# Patient Record
Sex: MALE | Race: ASIAN | URBAN - METROPOLITAN AREA
[De-identification: names, ages, dates, MRNs, and addresses within clinical notes are randomized per-mention and may not be internally consistent; named-entity substitution may affect disease eponyms.]

---

## 2022-03-02 ENCOUNTER — TELEPHONE (OUTPATIENT)
Dept: FAMILY MEDICINE CLINIC | Age: 57
End: 2022-03-02

## 2022-03-02 NOTE — TELEPHONE ENCOUNTER
----- Message from [employer] sent at 3/2/2022  9:32 AM EST -----  Subject: Appointment Request    Reason for Call: New Patient Request Appointment    QUESTIONS  Type of Appointment? New Patient/New to Provider  Reason for appointment request? No appointments available during search  Additional Information for Provider? Pt's daughter called on his behalf to   schedule an appt to establish care with Flores Walker. [facility]   [facility] as accepting new pts, no appointments found during search. Call pt's   daughter to schedule a new pt appt.  ---------------------------------------------------------------------------  --------------  CALL BACK INFO  What is the best way for the office to contact you? OK to leave message on   voicemail  Preferred Call Back Phone Number? [phone]  ---------------------------------------------------------------------------  --------------  SCRIPT ANSWERS  Relationship to Patient? Other  Representative Name? [employer]  Additional information verified (besides Name and Date of Birth)? Phone   Number  Specialty Confirmation? Primary Care  Is this the first appointment to establish care for a ? No  Have you been diagnosed with, awaiting test results for, or told that you   are suspected of having COVID-19 (Coronavirus)? (If patient has tested   negative or was tested as a requirement for work, school, or travel and   not based on symptoms, answer no)? No  Within the past 10 days have you developed any of the following symptoms   (answer no if symptoms have been present longer than 10 days or began   more than 10 days ago)? Fever or Chills, Cough, Shortness of breath or   difficulty breathing, Loss of taste or smell, Sore throat, Nasal   congestion, Sneezing or runny nose, Fatigue or generalized body aches   (answer no if pain is specific to a body part e.g. back pain), Diarrhea,   Headache? No  Have you had close contact with someone with COVID-19 in the last 7 days? No  (Service Expert  click yes below to proceed with [place] As Usual   Scheduling)?  Yes

## 2022-03-02 NOTE — TELEPHONE ENCOUNTER
Pt was dismissed from [facility] back in December due to no show. Spoke with pt's daughter. She stated that that appointment should have been canceled and not marked as a no show. Pt's wife comes here and their daughter stated that she spoke with Ceasar Weiss about this and that it should have been taken care of. Please advise if ok to schedule or not?

## 2022-03-06 ASSESSMENT — ENCOUNTER SYMPTOMS
DIARRHEA: 0
CHEST TIGHTNESS: 0
WHEEZING: 0
COUGH: 0
CONSTIPATION: 0
SHORTNESS OF BREATH: 0
VOMITING: 0
NAUSEA: 0
ABDOMINAL PAIN: 0

## 2022-03-07 ENCOUNTER — OFFICE VISIT (OUTPATIENT)
Dept: FAMILY MEDICINE CLINIC | Age: 57
End: 2022-03-07

## 2022-03-07 VITALS
SYSTOLIC BLOOD PRESSURE: 112 MMHG | WEIGHT: 141 LBS | HEART RATE: 60 BPM | DIASTOLIC BLOOD PRESSURE: 80 MMHG | OXYGEN SATURATION: 97 % | TEMPERATURE: 98 F

## 2022-03-07 DIAGNOSIS — Z78.9 NON-ENGLISH SPEAKING PATIENT: ICD-10-CM

## 2022-03-07 DIAGNOSIS — M62.838 MUSCLE SPASM: ICD-10-CM

## 2022-03-07 DIAGNOSIS — M25.511 ACUTE PAIN OF RIGHT SHOULDER: Primary | ICD-10-CM

## 2022-03-07 DIAGNOSIS — Z76.89 ENCOUNTER TO ESTABLISH CARE: ICD-10-CM

## 2022-03-07 PROCEDURE — 99203 OFFICE O/P NEW LOW 30 MIN: CPT | Performed by: CLINICAL NURSE SPECIALIST

## 2022-03-07 RX ORDER — TIZANIDINE 2 MG/1
2 TABLET ORAL 2 TIMES DAILY PRN
Qty: 20 TABLET | Refills: 0 | Status: SHIPPED | OUTPATIENT
Start: 2022-03-07

## 2022-03-07 RX ORDER — NAPROXEN 375 MG/1
375 TABLET ORAL 2 TIMES DAILY WITH MEALS
Qty: 60 TABLET | Refills: 0 | Status: SHIPPED | OUTPATIENT
Start: 2022-03-07

## 2022-03-07 ASSESSMENT — PATIENT HEALTH QUESTIONNAIRE - PHQ9
1. LITTLE INTEREST OR PLEASURE IN DOING THINGS: 0
SUM OF ALL RESPONSES TO PHQ QUESTIONS 1-9: 0

## 2022-03-07 NOTE — PATIENT INSTRUCTIONS
both arms. 2. Hold for 15 to 30 seconds. 3. Repeat 2 to 4 times. Follow-up care is a key part of your treatment and safety. Be sure to make and go to all appointments, and call your doctor if you are having problems. It's also a good idea to know your test results and keep a list of the medicines you take. Where can you learn more? Go to https://Clark Enterprises 2000peWhatsNew Asia.Good Deal. org and sign in to your [employer] account. Enter S254 in the Motopia box to learn more about \"Shoulder Stretches: Exercises. \"     If you do not have an account, please click on the \"Sign Up Now\" link. Current as of: July 1, 2021               Content Version: 13.1  © 2006-2021 Healthwise, Integrien. Care instructions adapted under license by [facility] ([facility]). If you have questions about a medical condition or this instruction, always ask your healthcare professional. [place] [address] any warranty or liability for your use of this information. Patient Education        Neck Spasm: Exercises  Introduction  Here are some examples of exercises for you to try. The exercises may be suggested for a condition or for rehabilitation. Start each exercise slowly. Ease off the exercises if you start to have pain. You will be told when to start these exercises and which ones will work best for you. How to do the exercises  Levator scapula stretch    1. Sit in a firm chair, or stand up straight. 2. Gently tilt your head toward your left shoulder. 3. Turn your head to look down into your armpit, bending your head slightly forward. Let the weight of your head stretch your neck muscles. 4. Hold for 15 to 30 seconds. 5. Return to your starting position. 6. Follow the same instructions above, but tilt your head toward your right shoulder. 7. Repeat 2 to 4 times toward each shoulder. Upper trapezius stretch    1. Sit in a firm chair, or stand up straight.   2. This stretch works best if you keep your shoulder down as you lean away from it. To help you remember to do this, start by relaxing your shoulders and lightly holding on to your thighs or your chair. 3. Tilt your head toward your shoulder and hold for 15 to 30 seconds. Let the weight of your head stretch your muscles. 4. If you would like a little added stretch, place your arm behind your back. Use the arm opposite of the direction you are tilting your head. For example, if you are tilting your head to the left, place your right arm behind your back. 5. Repeat 2 to 4 times toward each shoulder. Neck rotation    1. Sit in a firm chair, or stand up straight. 2. Keeping your chin level, turn your head to the right, and hold for 15 to 30 seconds. 3. Turn your head to the left, and hold for 15 to 30 seconds. 4. Repeat 2 to 4 times to each side. Chin tuck    1. Lie on the floor with a rolled-up towel under your neck. Your head should be touching the floor. 2. Slowly bring your chin toward the front of your neck. 3. Hold for a count of 6, and then relax for up to 10 seconds. 4. Repeat 8 to 12 times. Forward neck flexion    1. Sit in a firm chair, or stand up straight. 2. Bend your head forward. 3. Hold for 15 to 30 seconds, then return to your starting position. 4. Repeat 2 to 4 times. Follow-up care is a key part of your treatment and safety. Be sure to make and go to all appointments, and call your doctor if you are having problems. It's also a good idea to know your test results and keep a list of the medicines you take. Where can you learn more? Go to https://Synforapepiceweb.Magneceutical Health. org and sign in to your [employer] account. Enter P962 in the Anergis box to learn more about \"Neck Spasm: Exercises. \"     If you do not have an account, please click on the \"Sign Up Now\" link. Current as of: July 1, 2021               Content Version: 13.1  © 6704-3900 Healthwise, Incorporated.    Care instructions adapted under license by [id number] VenueSpot and shoulder stretch    1. Sit or stand tall and glide your head backward as in the dorsal glide stretch. 2. Raise both arms so that your hands are next to your ears. 3. Take a deep breath, and as you breathe out, lower your elbows down and behind your back. You will feel your shoulder blades slide down and together, and at the same time you will feel a stretch across your chest and the front of your shoulders. 4. Hold for about 6 seconds, and then relax for up to 10 seconds. 5. Repeat 8 to 12 times. Strengthening: Hands on head    1. Move your head backward, forward, and side to side against gentle pressure from your hands, holding each position for about 6 seconds. 2. Repeat 8 to 12 times. Follow-up care is a key part of your treatment and safety. Be sure to make and go to all appointments, and call your doctor if you are having problems. It's also a good idea to know your test results and keep a list of the medicines you take. Where can you learn more? Go to https://Executive Intermediary.Eventbrite. org and sign in to your Bent Pixels account. Enter P975 in the KylesToushay - It's what's in store box to learn more about \"Neck: Exercises. \"     If you do not have an account, please click on the \"Sign Up Now\" link. Current as of: July 1, 2021               Content Version: 13.1  © 7676-7393 Healthwise, Incorporated. Care instructions adapted under license by [facility] ([facility] If you have questions about a medical condition or this instruction, always ask your healthcare professional. [place] [address] any warranty or liability for your use of this information. Patient Education        Learning About How to Have a Healthy Back  What causes back pain? Back pain is often caused by overuse, strain, or injury. For example, people often hurt their backs playing sports or working in the yard, being jolted in a car accident, or lifting something too heavy. Aging plays a part too.  Your bones and muscles tend to lose strength as you age, which makes injury more likely. The spongy discs between the bones of the spine (vertebrae) may suffer from wear and tear and no longer provide enough cushion between the bones. A disc that bulges or breaks open (herniated disc) can press on nerves, causing back pain. In some people, back pain is the result of arthritis, broken vertebrae caused by bone loss (osteoporosis), illness, or a spine problem. Although most people have back pain at one time or another, there are steps you can take to make it less likely. How can you have a healthy back? Reduce stress on your back through good posture   Slumping or slouching alone may not cause low back pain. But after the back has been strained or injured, bad posture can make pain worse. · Sleep in a position that maintains your back's normal curves and on a mattress that feels comfortable. Sleep on your side with a pillow between your knees, or sleep on your back with a pillow under your knees. These positions can reduce strain on your back. · Stand and sit up straight. \"Good posture\" generally means your ears, shoulders, and hips are in a straight line. · If you must stand for a long time, put one foot on a stool, ledge, or box. Switch feet every now and then. · Sit in a chair that is low enough to let you place both feet flat on the floor with both knees nearly level with your hips. If your chair or desk is too high, use a footrest to raise your knees. Place a small pillow, a rolled-up towel, or a lumbar roll in the curve of your back if you need extra support. · Try a kneeling chair, which helps tilt your hips forward. This takes pressure off your lower back. · Try sitting on an exercise ball. It can rock from side to side, which helps keep your back loose. · When driving, keep your knees nearly level with your hips. Sit straight, and drive with both hands on the steering wheel.  Your arms should be in a slightly bent position. Reduce stress on your back through careful lifting   · Squat down, bending at the hips and knees only. If you need to, put one knee to the floor and extend your other knee in front of you, bent at a right angle (half kneeling). · Press your chest straight forward. This helps keep your upper back straight while keeping a slight arch in your low back. · Hold the load as close to your body as possible, at the level of your belly button (navel). · Use your feet to change direction, taking small steps. · Lead with your hips as you change direction. Keep your shoulders in line with your hips as you move. · Set down your load carefully, squatting with your knees and hips only. Exercise and stretch your back   · Do some exercise on most days of the week, if your doctor says it is okay. You can walk, run, swim, or cycle. · Stretch your back muscles. Here are a few exercises to try:  ? Lie on your back, and gently pull one bent knee to your chest. Put that foot back on the floor, and then pull the other knee to your chest.  ? Do pelvic tilts. Lie on your back with your knees bent. Tighten your stomach muscles. Pull your belly button (navel) in and up toward your ribs. You should feel like your back is pressing to the floor and your hips and pelvis are slightly lifting off the floor. Hold for 6 seconds while breathing smoothly. ? Sit with your back flat against a wall. · Keep your core muscles strong. The muscles of your back, belly (abdomen), and buttocks support your spine. ? Pull in your belly and imagine pulling your navel toward your spine. Hold this for 6 seconds, then relax. Remember to keep breathing normally as you tense your muscles. ? Do curl-ups. Always do them with your knees bent. Keep your low back on the floor, and curl your shoulders toward your knees using a smooth, slow motion.  Keep your arms folded across your chest. If this bothers your neck, try putting your hands behind your neck (not your head), with your elbows spread apart. ? Lie on your back with your knees bent and your feet flat on the floor. Tighten your belly muscles, and then push with your feet and raise your buttocks up a few inches. Hold this position 6 seconds as you continue to breathe normally, then lower yourself slowly to the floor. Repeat 8 to 12 times. ? If you like group exercise, try Pilates or yoga. These classes have poses that strengthen the core muscles. Lead a healthy lifestyle   · Stay at a healthy weight to avoid strain on your back. · Do not smoke. Smoking increases the risk of osteoporosis, which weakens the spine. If you need help quitting, talk to your doctor about stop-smoking programs and medicines. These can increase your chances of quitting for good. Where can you learn more? Go to https://"IntelliQuest Information Group, Inc"bell.Vault Dragon. org and sign in to your [employer] account. Enter L315 in the FAMOCO box to learn more about \"Learning About How to Have a Healthy Back. \"     If you do not have an account, please click on the \"Sign Up Now\" link. Current as of: July 1, 2021               Content Version: 13.1  © 4530-4695 Bufys. Care instructions adapted under license by [facility] ([facility]). If you have questions about a medical condition or this instruction, always ask your healthcare professional. [place] [address] any warranty or liability for your use of this information. Patient Education        Back Stretches: Exercises  Introduction  Here are some examples of exercises for stretching your back. Start each exercise slowly. Ease off the exercise if you start to have pain. Your doctor or physical therapist will tell you when you can start these exercises and which ones will work best for you. How to do the exercises  Overhead stretch    9. Stand comfortably with your feet shoulder-width apart.   10. Looking straight ahead, raise both arms over your head and reach toward the ceiling. Do not allow your head to tilt back. 11. Hold for 15 to 30 seconds, then lower your arms to your sides. 12. Repeat 2 to 4 times. Side stretch    10. Stand comfortably with your feet shoulder-width apart. 11. Raise one arm over your head, and then lean to the other side. 12. Slide your hand down your leg as you let the weight of your arm gently stretch your side muscles. Hold for 15 to 30 seconds. 13. Repeat 2 to 4 times on each side. Press-up    8. Lie on your stomach, supporting your body with your forearms. 9. Press your elbows down into the floor to raise your upper back. As you do this, relax your stomach muscles and allow your back to arch without using your back muscles. As your press up, do not let your hips or pelvis come off the floor. 10. Hold for 15 to 30 seconds, then relax. 11. Repeat 2 to 4 times. Relax and rest    6. Lie on your back with a rolled towel under your neck and a pillow under your knees. Extend your arms comfortably to your sides. 7. Relax and breathe normally. 8. Remain in this position for about 10 minutes. 9. If you can, do this 2 or 3 times each day. Follow-up care is a key part of your treatment and safety. Be sure to make and go to all appointments, and call your doctor if you are having problems. It's also a good idea to know your test results and keep a list of the medicines you take. Where can you learn more? Go to https://Sensinodebell.Talk Local. org and sign in to your [place] [employer] account. Enter L237 in the Viva Developments box to learn more about \"Back Stretches: Exercises. \"     If you do not have an account, please click on the \"Sign Up Now\" link. Current as of: July 1, 2021               Content Version: 13.1  © 8523-8467 Healthwise, Incorporated. Care instructions adapted under license by [facility] ([facility]).  If you have questions about a medical condition or this instruction, always ask your healthcare professional. Shannon Murphy, Incorporated disclaims any warranty or liability for your use of this information. Patient Education        Healthy Upper Back: Exercises  Introduction  Here are some examples of exercises for your upper back. Start each exercise slowly. Ease off the exercise if you start to have pain. Your doctor or physical therapist will tell you when you can start these exercises and which ones will work best for you. How to do the exercises  Lower neck and upper back stretch    1. Stretch your arms out in front of your body. Clasp one hand on top of your other hand. 2. Gently reach out so that you feel your shoulder blades stretching away from each other. 3. Gently bend your head forward. 4. Hold for 15 to 30 seconds. 5. Repeat 2 to 4 times. Midback stretch    If you have knee pain, do not do this exercise. 1. Kneel on the floor, and sit back on your ankles. 2. Lean forward, place your hands on the floor, and stretch your arms out in front of you. Rest your head between your arms. 3. Gently push your chest toward the floor, reaching as far in front of you as possible. 4. Hold for 15 to 30 seconds. 5. Repeat 2 to 4 times. Shoulder rolls    1. Sit comfortably with your feet shoulder-width apart. You can also do this exercise while standing. 2. Roll your shoulders up, then back, and then down in a smooth, circular motion. 3. Repeat 2 to 4 times. Wall push-up    1. Stand against a wall with your feet about 12 to 24 inches back from the wall. If you feel any pain when you do this exercise, stand closer to the wall. 2. Place your hands on the wall slightly wider apart than your shoulders, and lean forward. 3. Gently lean your body toward the wall. Then push back to your starting position. Keep the motion smooth and controlled. 4. Repeat 8 to 12 times. Resisted shoulder blade squeeze    For this exercise, you will need elastic exercise material, such as surgical tubing or Thera-Band.   1. Sit or stand, holding the band in both hands in front of you. Keep your elbows close to your sides, bent at a 90-degree angle. Your palms should face up. 2. Squeeze your shoulder blades together, and move your arms to the outside, stretching the band. Be sure to keep your elbows at your sides while you do this. 3. Relax. 4. Repeat 8 to 12 times. Resisted rows    For this exercise, you will need elastic exercise material, such as surgical tubing or Thera-Band. 1. Put the band around a solid object, such as a bedpost, at about waist level. Hold one end of the band in each hand. 2. With your elbows at your sides and bent to 90 degrees, pull the band back to move your shoulder blades toward each other. Return to the starting position. 3. Repeat 8 to 12 times. Follow-up care is a key part of your treatment and safety. Be sure to make and go to all appointments, and call your doctor if you are having problems. It's also a good idea to know your test results and keep a list of the medicines you take. Where can you learn more? Go to https://US Medical InnovationspeEZBOBeb.Primo Round. org and sign in to your [email or web address] account. Enter Q590 in the [email or web address] box to learn more about \"Healthy Upper Back: Exercises. \"     If you do not have an account, please click on the \"Sign Up Now\" link. Current as of: July 1, 2021               Content Version: 13.1  © 2006-2021 Healthwise, Incorporated. Care instructions adapted under license by [facility] ([facility]). If you have questions about a medical condition or this instruction, always ask your healthcare professional. [place] [address] any warranty or liability for your use of this information.

## 2022-03-07 NOTE — PROGRESS NOTES
SUBJECTIVE:    Patient ID:  Rachel Gupta is a 55 y.o. male      This visit was conducted using the interpreting service for Turkmen,  number [id number]. Patient is here for concern of right shoulder/arm pain. State he has been seen at urgent care for this issue. Patient's allergies, medication, medical, surgical, family and social history were reviewed and updated accordingly. He is current not taking any medications. Shoulder Pain   The pain is present in the right shoulder, right arm, right wrist, right hand and right elbow. This is a new problem. Episode onset: 6 weeks  There has been no history of extremity trauma. The problem occurs constantly. The problem has been gradually worsening. The quality of the pain is described as aching and sharp. The pain is severe. Associated symptoms include a limited range of motion and stiffness. Pertinent negatives include no fever, inability to bear weight, joint locking, numbness or tingling. The symptoms are aggravated by lying down. He has tried acetaminophen, cold and rest for the symptoms. No current outpatient medications on file prior to visit. No current facility-administered medications on file prior to visit. History reviewed. No pertinent past medical history. History reviewed. No pertinent surgical history.   Family History   Problem Relation Age of Onset    Diabetes Mother     Other Father         hernia     Social History     Socioeconomic History    Marital status:      Spouse name: Not on file    Number of children: Not on file    Years of education: Not on file    Highest education level: Not on file   Occupational History    Not on file   Tobacco Use    Smoking status: Never Smoker    Smokeless tobacco: Former User     Types: Chew   Vaping Use    Vaping Use: Never used   Substance and Sexual Activity    Alcohol use: Not Currently    Drug use: Never    Sexual activity: Not on file   Other Topics Concern  Not on file   Social History Narrative    Not on file     Social Determinants of Health     Financial Resource Strain:     Difficulty of Paying Living Expenses: Not on file   Food Insecurity:     Worried About Running Out of Food in the Last Year: Not on file    Ap of Food in the Last Year: Not on file   Transportation Needs:     Lack of Transportation (Medical): Not on file    Lack of Transportation (Non-Medical): Not on file   Physical Activity:     Days of Exercise per Week: Not on file    Minutes of Exercise per Session: Not on file   Stress:     Feeling of Stress : Not on file   Social Connections:     Frequency of Communication with Friends and Family: Not on file    Frequency of Social Gatherings with Friends and Family: Not on file    Attends Zoroastrianism Services: Not on file    Active Member of [address] [place] [address] or Organizations: Not on file    Attends Club or Organization Meetings: Not on file    Marital Status: Not on file   Intimate Partner Violence:     Fear of Current or Ex-Partner: Not on file    Emotionally Abused: Not on file    Physically Abused: Not on file    Sexually Abused: Not on file   Housing Stability:     Unable to Pay for Housing in the Last Year: Not on file    Number of [place] in the Last Year: Not on file    Unstable Housing in the Last Year: Not on file       Review of Systems   Constitutional: Negative for chills and fever. Eyes: Negative for visual disturbance. Respiratory: Negative for cough, chest tightness, shortness of breath and wheezing. Cardiovascular: Negative for chest pain, palpitations and leg swelling. Gastrointestinal: Negative for abdominal pain, constipation, diarrhea, nausea and vomiting. Musculoskeletal: Positive for arthralgias (right shoulder/arm) and stiffness. Negative for myalgias. Skin: Negative for rash. Neurological: Negative for tingling, numbness and headaches.        OBJECTIVE:    Physical Exam  Vitals and nursing note reviewed. Constitutional:       General: He is not in acute distress. Appearance: Normal appearance. He is well-developed. He is not ill-appearing. HENT:      Head: Normocephalic and atraumatic. Right Ear: External ear normal.      Left Ear: External ear normal.      Nose: Nose normal.   Eyes:      Conjunctiva/sclera: Conjunctivae normal.      Pupils: Pupils are equal, round, and reactive to light. Neck:      Vascular: No carotid bruit. Trachea: No tracheal deviation. Cardiovascular:      Rate and Rhythm: Normal rate and regular rhythm. Heart sounds: Normal heart sounds. Pulmonary:      Effort: Pulmonary effort is normal. No respiratory distress. Breath sounds: Normal breath sounds. No wheezing or rales. Chest:      Chest wall: No tenderness. Abdominal:      General: Bowel sounds are normal. There is no distension. Palpations: Abdomen is soft. There is no mass. Tenderness: There is no abdominal tenderness. Hernia: No hernia is present. Musculoskeletal:      Cervical back: Normal range of motion and neck supple. Right lower leg: No edema. Left lower leg: No edema. Comments: Slight decreased ROM of the right shoulder/arm due to discomfort, no erythema, warmth or swelling noted   Skin:     General: Skin is warm and dry. Findings: No rash. Neurological:      Mental Status: He is alert and oriented to person, place, and time. Psychiatric:         Behavior: Behavior normal.       /80   Pulse 60   Temp 98 °F (36.7 °C)   Wt 141 lb (64 kg)   SpO2 97%    BP Readings from Last 3 Encounters:   03/07/22 112/80      Wt Readings from Last 3 Encounters:   03/07/22 141 lb (64 kg)       ASSESSMENT & PLAN:    1. Acute pain of right shoulder  - XR SHOULDER RIGHT (MIN 2 VIEWS); Future  - naproxen (NAPROSYN) 375 MG tablet; Take 1 tablet by mouth 2 times daily (with meals)  Dispense: 60 tablet; Refill: 0  - tiZANidine (ZANAFLEX) 2 MG tablet;  Take 1 tablet by mouth 2 times daily as needed (muscle spasms)  Dispense: 20 tablet; Refill: 0    2. Muscle spasm  - tiZANidine (ZANAFLEX) 2 MG tablet; Take 1 tablet by mouth 2 times daily as needed (muscle spasms)  Dispense: 20 tablet; Refill: 0 (may cause drowsiness)    3. Encounter to establish care  - Sign appropriate paperwork to have records sent to the office  - Encourage to schedule complete physical with fasting labs    4. Non-English speaking patient   - Interpreting service for Croatian use      Continue current treatment plan. Current Outpatient Medications   Medication Sig Dispense Refill    naproxen (NAPROSYN) 375 MG tablet Take 1 tablet by mouth 2 times daily (with meals) 60 tablet 0    tiZANidine (ZANAFLEX) 2 MG tablet Take 1 tablet by mouth 2 times daily as needed (muscle spasms) 20 tablet 0     No current facility-administered medications for this visit. Return if symptoms worsen or fail to improve, for right shoulder pain, muscle spasm, establish care. Anna Ortega received counseling on the following healthy behaviors: nutrition, exercise and medication adherence    Patient given educational materials on Exercise    Discussed use, benefit, and side effects of prescribed medications. Barriers to medication compliance addressed. All patient questions answered. Pt voiced understanding. Call office if experience side effects from medications. Please note that some or all of this record was generated using voice recognition software. If there are any questions about the content of this document, please contact the author as some errors in transcription may have occurred.

## 2022-04-03 DIAGNOSIS — M25.511 ACUTE PAIN OF RIGHT SHOULDER: ICD-10-CM

## 2022-04-04 RX ORDER — NAPROXEN 375 MG/1
TABLET ORAL
Qty: 60 TABLET | Refills: 0 | OUTPATIENT
Start: 2022-04-04